# Patient Record
Sex: MALE | NOT HISPANIC OR LATINO | Employment: UNEMPLOYED | URBAN - METROPOLITAN AREA
[De-identification: names, ages, dates, MRNs, and addresses within clinical notes are randomized per-mention and may not be internally consistent; named-entity substitution may affect disease eponyms.]

---
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
## 2024-10-26 ENCOUNTER — HOSPITAL ENCOUNTER (EMERGENCY)
Facility: CLINIC | Age: 19
Discharge: HOME | End: 2024-10-26
Attending: EMERGENCY MEDICINE | Admitting: EMERGENCY MEDICINE
Payer: COMMERCIAL

## 2024-10-26 VITALS
HEART RATE: 109 BPM | BODY MASS INDEX: 34.65 KG/M2 | OXYGEN SATURATION: 98 % | RESPIRATION RATE: 15 BRPM | TEMPERATURE: 97.9 F | HEIGHT: 74 IN | WEIGHT: 270 LBS | DIASTOLIC BLOOD PRESSURE: 75 MMHG | SYSTOLIC BLOOD PRESSURE: 125 MMHG

## 2024-10-26 DIAGNOSIS — M54.50 ACUTE BILATERAL LOW BACK PAIN WITHOUT SCIATICA: ICD-10-CM

## 2024-10-26 PROCEDURE — 250N000013 HC RX MED GY IP 250 OP 250 PS 637: Performed by: EMERGENCY MEDICINE

## 2024-10-26 PROCEDURE — 99283 EMERGENCY DEPT VISIT LOW MDM: CPT | Performed by: EMERGENCY MEDICINE

## 2024-10-26 RX ORDER — IBUPROFEN 600 MG/1
600 TABLET, FILM COATED ORAL ONCE
Status: COMPLETED | OUTPATIENT
Start: 2024-10-26 | End: 2024-10-26

## 2024-10-26 RX ORDER — CYCLOBENZAPRINE HCL 10 MG
10 TABLET ORAL 3 TIMES DAILY PRN
Qty: 20 TABLET | Refills: 0 | Status: SHIPPED | OUTPATIENT
Start: 2024-10-26 | End: 2024-11-01

## 2024-10-26 RX ORDER — ACETAMINOPHEN 500 MG
1000 TABLET ORAL ONCE
Status: COMPLETED | OUTPATIENT
Start: 2024-10-26 | End: 2024-10-26

## 2024-10-26 RX ADMIN — IBUPROFEN 600 MG: 600 TABLET, FILM COATED ORAL at 21:06

## 2024-10-26 RX ADMIN — ACETAMINOPHEN 1000 MG: 500 TABLET ORAL at 21:06

## 2024-10-26 ASSESSMENT — ACTIVITIES OF DAILY LIVING (ADL): ADLS_ACUITY_SCORE: 0

## 2024-10-27 NOTE — ED TRIAGE NOTES
"Patient ambulatory to triage with complaints of lower back pain. Around 2pm patient was attempting to \"lift a heavy bench\" when he had a sudden sharp, shooting pain down in the lower back and to the BLE. Patient reports he momentarily could not feel his legs, but sensation has since returned.      Triage Assessment (Adult)       Row Name 10/26/24 1958          Triage Assessment    Airway WDL WDL        Respiratory WDL    Respiratory WDL WDL        Skin Circulation/Temperature WDL    Skin Circulation/Temperature WDL WDL        Cardiac WDL    Cardiac WDL WDL        Peripheral/Neurovascular WDL    Peripheral Neurovascular WDL WDL        Cognitive/Neuro/Behavioral WDL    Cognitive/Neuro/Behavioral WDL WDL                     "

## 2024-10-27 NOTE — ED PROVIDER NOTES
"      ED Provider Note  October 26, 2024  [facility]      History     Chief Complaint: Back Pain      HPI  Master Suero is a 19 year old male who has a PMH significant for asthma and GBS presents to the ED with lower back pain. Patient reports that around 2:00 PM, patient was attempting to \"lift heavy bench\" when he has a sudden sharp, shooting pain down in the lower back and to the BLE. He states he momentarily could not feel his legs for several seconds, and then sensation returned.  He was able to proceed to the [employer] game continue to have pain there.  Went home took a nap woke up and continued to have pain so came to the emergency department for further evaluation.    No groin or perineal or scrotal numbness.  No numbness or pain shooting down his lower extremities currently.  Has been able to void without difficulty.  No incontinence.  No fevers.  No history of similar events.    Able to ambulate but with discomfort.  Does have a position of comfort which is essentially laying supine or on his side at which point he has no back pain.    Past Medical History  Past Medical History:   Diagnosis Date    GBS (Guillain [place] syndrome) (H)     Uncomplicated asthma      No past surgical history on file.  cyclobenzaprine (FLEXERIL) 10 MG tablet  METHYLPHENIDATE HCL PO      No Known Allergies  Family History  No family history on file.  Social History   Social History     Tobacco Use    Smoking status: Never   Substance Use Topics    Alcohol use: No     Alcohol/week: 0.0 standard drinks of alcohol    Drug use: No        Past medical history, past surgical history, medications, allergies, family history, and social history were reviewed with the patient. No additional pertinent items.      A medically appropriate review of systems was performed with pertinent positives and negatives noted in the HPI, and all other systems negative.    Physical Exam   /75   Pulse 109   Temp 97.9  F " "(36.6  C) (Oral)   Resp 15   Ht 1.88 m (6' 2\")   Wt 122.5 kg (270 lb)   SpO2 98%   BMI 34.67 kg/m      GEN: Well appearing, non toxic, cooperative and conversant.   HEENT: The head is normocephalic and atraumatic. Pupils are equal round and reactive to light. Extraocular motions are intact. There is no facial swelling.   CV: Regular rate   PULM: Unlabored breathing   Back no midline t or l-spine TTP.  Bilateral paraspinous  lumbar TTP   EXT: Full range of motion.  No edema.  NEURO: Cranial nerves II through XII are intact and symmetric. Bilateral upper and lower extremities grossly show full range of motion without any focal deficits.  Steady, stable normal gait. EHL, FHL, TA 5/5 and symmetric, SILT.       PSYCH: Calm and cooperative, interactive.       ED Course, Procedures, & Data      Procedures                    No results found for any visits on 10/26/24.  Medications   acetaminophen (TYLENOL) tablet 1,000 mg (1,000 mg Oral $Given 10/26/24 2106)   ibuprofen (ADVIL/MOTRIN) tablet 600 mg (600 mg Oral $Given 10/26/24 2106)     Labs Ordered and Resulted from Time of ED Arrival to Time of ED Departure - No data to display  No orders to display            Medical Decision Making Matrix    Problems Addressed 1 acute uncomplicated illness or injury and 1 stable acute illness       Data   Considered        Risk of Patient Management      Moderate Risk: Prescription drug management            Assessment & Plan    19-year-old male with history as noted presenting with acute low back pain following attempt to lift heavy object  DDx includes cauda equina, spinal injury, radiculopathy, sciatica, fracture, infection, bleed, among other causes    Clinically patient is very well-appearing has a reassuring neurological examination and temporal course.  Given Tylenol ibuprofen for pain.  Discussed OTC analgesics and likely benefit from physical therapy.  Overall no red flags on history.  Appropriate for outpatient follow-up " with OTC analgesics.  Also prescribed Flexeril as needed for pain and spasm  Discussed strict ED return precautions with care.    - Patient agrees to our plan and is ready and eager for discharge. Care plan, follow up plan, and reasons to return immediately to the ED were dicussed in detail and summarized as noted in the discharge instructions.      I have reviewed the nursing notes. I have reviewed the findings, diagnosis, plan and need for follow up with the patient.    Discharge Medication List as of 10/26/2024  9:25 PM        START taking these medications    Details   cyclobenzaprine (FLEXERIL) 10 MG tablet Take 1 tablet (10 mg) by mouth 3 times daily as needed for muscle spasms., Disp-20 tablet, R-0, Local Print             Final diagnoses:   Acute bilateral low back pain without sciatica       Joe Veliz MD  [facility] EMERGENCY DEPARTMENT  October 26, 2024       Joe Veliz MD  10/26/24 2290

## 2024-10-27 NOTE — DISCHARGE INSTRUCTIONS
Please return to the Emergency Department immediately for any worsening pain, fever, swelling, weakness in your arms or legs, difficulty moving your trunk, standing, walking, urinating or having a bowel movement, numbness in your groin, or any other concerns for worsening.     You can take tylenol as needed for pain. The maximum daily dose is 4000 mg and the maximum single dose at a time is 1000mg. For your condition, your should take 1000mg every 6 hours as needed for pain.    You can take ibuprofen for pain. The maximum daily dose is 2400 mg and the maximum single dose as a time is 800mg. For your condition, your should take 600mg every 4 hours as needed for pain.    You may also try Flexeril as prescribed and discussed.